# Patient Record
Sex: MALE | Race: WHITE | HISPANIC OR LATINO | Employment: FULL TIME | URBAN - METROPOLITAN AREA
[De-identification: names, ages, dates, MRNs, and addresses within clinical notes are randomized per-mention and may not be internally consistent; named-entity substitution may affect disease eponyms.]

---

## 2017-01-30 ENCOUNTER — APPOINTMENT (OUTPATIENT)
Dept: RADIOLOGY | Facility: MEDICAL CENTER | Age: 52
End: 2017-01-30
Attending: EMERGENCY MEDICINE
Payer: COMMERCIAL

## 2017-01-30 ENCOUNTER — HOSPITAL ENCOUNTER (EMERGENCY)
Facility: MEDICAL CENTER | Age: 52
End: 2017-01-30
Attending: EMERGENCY MEDICINE
Payer: COMMERCIAL

## 2017-01-30 VITALS
SYSTOLIC BLOOD PRESSURE: 117 MMHG | TEMPERATURE: 99.6 F | RESPIRATION RATE: 18 BRPM | DIASTOLIC BLOOD PRESSURE: 79 MMHG | OXYGEN SATURATION: 96 % | HEIGHT: 64 IN | HEART RATE: 71 BPM

## 2017-01-30 DIAGNOSIS — R51.9 NONINTRACTABLE EPISODIC HEADACHE, UNSPECIFIED HEADACHE TYPE: ICD-10-CM

## 2017-01-30 DIAGNOSIS — J98.4 LUNG CYST: ICD-10-CM

## 2017-01-30 DIAGNOSIS — R42 DIZZINESS: ICD-10-CM

## 2017-01-30 LAB
ALBUMIN SERPL BCP-MCNC: 4.7 G/DL (ref 3.2–4.9)
ALBUMIN/GLOB SERPL: 1.4 G/DL
ALP SERPL-CCNC: 79 U/L (ref 30–99)
ALT SERPL-CCNC: 24 U/L (ref 2–50)
ANION GAP SERPL CALC-SCNC: 11 MMOL/L (ref 0–11.9)
APTT PPP: 28.7 SEC (ref 24.7–36)
AST SERPL-CCNC: 21 U/L (ref 12–45)
BASOPHILS # BLD AUTO: 0.9 % (ref 0–1.8)
BASOPHILS # BLD: 0.06 K/UL (ref 0–0.12)
BILIRUB SERPL-MCNC: 0.9 MG/DL (ref 0.1–1.5)
BUN SERPL-MCNC: 15 MG/DL (ref 8–22)
CALCIUM SERPL-MCNC: 10 MG/DL (ref 8.5–10.5)
CHLORIDE SERPL-SCNC: 105 MMOL/L (ref 96–112)
CO2 SERPL-SCNC: 25 MMOL/L (ref 20–33)
CREAT SERPL-MCNC: 0.7 MG/DL (ref 0.5–1.4)
EOSINOPHIL # BLD AUTO: 0.01 K/UL (ref 0–0.51)
EOSINOPHIL NFR BLD: 0.1 % (ref 0–6.9)
ERYTHROCYTE [DISTWIDTH] IN BLOOD BY AUTOMATED COUNT: 40.9 FL (ref 35.9–50)
GFR SERPL CREATININE-BSD FRML MDRD: >60 ML/MIN/1.73 M 2
GLOBULIN SER CALC-MCNC: 3.4 G/DL (ref 1.9–3.5)
GLUCOSE SERPL-MCNC: 95 MG/DL (ref 65–99)
HCT VFR BLD AUTO: 45.7 % (ref 42–52)
HGB BLD-MCNC: 16.1 G/DL (ref 14–18)
IMM GRANULOCYTES # BLD AUTO: 0.01 K/UL (ref 0–0.11)
IMM GRANULOCYTES NFR BLD AUTO: 0.1 % (ref 0–0.9)
INR PPP: 1.06 (ref 0.87–1.13)
LYMPHOCYTES # BLD AUTO: 2.83 K/UL (ref 1–4.8)
LYMPHOCYTES NFR BLD: 41.8 % (ref 22–41)
MCH RBC QN AUTO: 32.2 PG (ref 27–33)
MCHC RBC AUTO-ENTMCNC: 35.2 G/DL (ref 33.7–35.3)
MCV RBC AUTO: 91.4 FL (ref 81.4–97.8)
MONOCYTES # BLD AUTO: 0.57 K/UL (ref 0–0.85)
MONOCYTES NFR BLD AUTO: 8.4 % (ref 0–13.4)
NEUTROPHILS # BLD AUTO: 3.29 K/UL (ref 1.82–7.42)
NEUTROPHILS NFR BLD: 48.7 % (ref 44–72)
NRBC # BLD AUTO: 0 K/UL
NRBC BLD AUTO-RTO: 0 /100 WBC
PLATELET # BLD AUTO: 291 K/UL (ref 164–446)
PMV BLD AUTO: 9.4 FL (ref 9–12.9)
POTASSIUM SERPL-SCNC: 3.8 MMOL/L (ref 3.6–5.5)
PROT SERPL-MCNC: 8.1 G/DL (ref 6–8.2)
PROTHROMBIN TIME: 14.1 SEC (ref 12–14.6)
RBC # BLD AUTO: 5 M/UL (ref 4.7–6.1)
SODIUM SERPL-SCNC: 141 MMOL/L (ref 135–145)
TROPONIN I SERPL-MCNC: <0.01 NG/ML (ref 0–0.04)
WBC # BLD AUTO: 6.8 K/UL (ref 4.8–10.8)

## 2017-01-30 PROCEDURE — 70498 CT ANGIOGRAPHY NECK: CPT

## 2017-01-30 PROCEDURE — 80053 COMPREHEN METABOLIC PANEL: CPT

## 2017-01-30 PROCEDURE — 70496 CT ANGIOGRAPHY HEAD: CPT

## 2017-01-30 PROCEDURE — 84484 ASSAY OF TROPONIN QUANT: CPT

## 2017-01-30 PROCEDURE — 99284 EMERGENCY DEPT VISIT MOD MDM: CPT

## 2017-01-30 PROCEDURE — 85610 PROTHROMBIN TIME: CPT

## 2017-01-30 PROCEDURE — 96360 HYDRATION IV INFUSION INIT: CPT

## 2017-01-30 PROCEDURE — 700117 HCHG RX CONTRAST REV CODE 255: Performed by: EMERGENCY MEDICINE

## 2017-01-30 PROCEDURE — 700105 HCHG RX REV CODE 258: Performed by: EMERGENCY MEDICINE

## 2017-01-30 PROCEDURE — 94760 N-INVAS EAR/PLS OXIMETRY 1: CPT

## 2017-01-30 PROCEDURE — 85730 THROMBOPLASTIN TIME PARTIAL: CPT

## 2017-01-30 PROCEDURE — 85025 COMPLETE CBC W/AUTO DIFF WBC: CPT

## 2017-01-30 PROCEDURE — 93005 ELECTROCARDIOGRAM TRACING: CPT | Performed by: EMERGENCY MEDICINE

## 2017-01-30 RX ORDER — SODIUM CHLORIDE 9 MG/ML
1000 INJECTION, SOLUTION INTRAVENOUS ONCE
Status: COMPLETED | OUTPATIENT
Start: 2017-01-30 | End: 2017-01-30

## 2017-01-30 RX ORDER — MECLIZINE HYDROCHLORIDE 25 MG/1
25 TABLET ORAL 3 TIMES DAILY PRN
Qty: 30 TAB | Refills: 0 | Status: SHIPPED | OUTPATIENT
Start: 2017-01-30

## 2017-01-30 RX ADMIN — IOHEXOL 100 ML: 350 INJECTION, SOLUTION INTRAVENOUS at 21:59

## 2017-01-30 RX ADMIN — SODIUM CHLORIDE 1000 ML: 9 INJECTION, SOLUTION INTRAVENOUS at 21:06

## 2017-01-30 ASSESSMENT — ENCOUNTER SYMPTOMS
FALLS: 1
SPEECH CHANGE: 0
TINGLING: 0
NAUSEA: 0
MEMORY LOSS: 0
SENSORY CHANGE: 0
DIZZINESS: 1
LOSS OF CONSCIOUSNESS: 0
HEADACHES: 1
VOMITING: 0
NECK PAIN: 0

## 2017-01-30 ASSESSMENT — PAIN SCALES - GENERAL
PAINLEVEL_OUTOF10: 0
PAINLEVEL_OUTOF10: 8

## 2017-01-30 ASSESSMENT — PAIN SCALES - WONG BAKER: WONGBAKER_NUMERICALRESPONSE: HURTS EVEN MORE

## 2017-01-30 NOTE — ED AVS SNAPSHOT
After Visit Summary                                                                                                                Tee Meadows   MRN: [id number]    Department:  [facility], Emergency Dept   Date of Visit:  1/30/2017            [facility], Emergency Dept    [address] [facility] [address]    Phone:  [phone]      You were seen by     Mariusz Finley M.D.      Your Diagnosis Was     Dizziness     R42       These are the medications you received during your hospitalization from 01/30/2017 1824 to 01/30/2017 2251     Date/Time Order Dose Route Action    01/30/2017 2106 NS infusion 1,000 mL 1,000 mL Intravenous New Bag    01/30/2017 2159 iohexol (OMNIPAQUE) 350 mg/mL 100 mL Intravenous Given      Follow-up Information     1. Follow up with [facility]. Schedule an appointment as soon as possible for a visit in 2 days.    Contact information    [address] [facility] [address] [facility] [id number] [phone]          2. Follow up with [facility].    Contact information    [address]  [facility] [address] [phone]          3. Follow up with [facility].    Why:  If you need a doctor    Contact information    [address] [address] [phone]      Medication Information     Review all of your home medications and newly ordered medications with your primary doctor and/or pharmacist as soon as possible. Follow medication instructions as directed by your doctor and/or pharmacist.     Please keep your complete medication list with you and share with your physician. Update the information when medications are discontinued, doses are changed, or new medications (including over-the-counter products) are added; and carry medication information at all times in the event of emergency situations.               Medication List      START taking these medications        Instructions    meclizine 25 MG Tabs   Commonly known as:   ANTIVERT    Take 1 Tab by mouth 3 times a day as needed.   Dose:  25 mg               Procedures and tests performed during your visit     APTT    CBC WITH DIFFERENTIAL    COMP METABOLIC PANEL    CONSENT FOR CONTRAST INJECTION    CT-CTA HEAD WITH & W/O-POST PROCESS    CT-CTA NECK WITH & W/O-POST PROCESSING    Cardiac Monitoring    EKG (NOW)    ESTIMATED GFR    IV Saline Lock    Obtain Old EKG    PROTHROMBIN TIME    Pulse Ox    TROPONIN        Discharge Instructions       Por favor, siga con un médico de cabecera para la gestión de la presión arterial, la detección de diabéticos, y todas las otras preocupaciones de bertram preventiva    Your have a lung cyst seen on the CT scan in your right lung. You need to have this followed up by your doctor. Return if you have chest pain, shortness of breath, severe dizziness or pass out.       Mareos  (Dizziness)  Los mareos son un problema muy frecuente. Se trata de georgi sensación de inestabilidad o de desvanecimiento. Puede sentir que se va a desmayar. Un mareo puede provocarle georgi lesión si se tropieza o se . Las personas de todas las edades pueden sufrir mareos, saul es más frecuente en los adultos mayores. Esta afección puede tener muchas causas, entre las que se pueden mencionar los medicamentos, la deshidratación y las enfermedades.  INSTRUCCIONES PARA EL CUIDADO EN EL HOGAR  Estas indicaciones pueden ayudarlo con el trastorno:  Comida y bebida  · Nimo suficiente líquido para mantener la orina duke o de color amarillo pálido. [place] maricarmen la deshidratación. Trate de beber más líquidos transparentes, juanita agua.  · No nimo alcohol.  · Limite el consumo de cafeína si el médico se lo indica.  · Limite el consumo de sal si el médico se lo indica.  Actividad  · Evite los movimientos rápidos.  · Levántese de las joseph con lentitud y apóyese hasta sentirse micki.  · Por la mañana, siéntese elizabeth a un lado de la cama. Cuando se sienta micki, póngase lentamente de pie mientras se  sostiene de algo, hasta que sepa que ha logrado el equilibrio.  · Mueva las piernas con frecuencia si debe estar de pie en un lugar mathew mucho tiempo. Mientras está de pie, contraiga y relaje los músculos de las piernas.  · No conduzca vehículos ni opere maquinaria pesada si se siente mareado.  · Evite agacharse si se siente mareado. En morley casa, coloque los objetos de modo que le resulte fácil alcanzarlos sin agacharse.  Estilo de keith  · No consuma ningún producto que contenga tabaco, lo que incluye cigarrillos, tabaco de mascar o cigarrillos electrónicos. Si necesita ayuda para dejar de fumar, consulte al médico.  · Trate de reducir el nivel de estrés practicando actividades juanita el yoga o la meditación. Hable con el médico si necesita ayuda.  Instrucciones generales  · Controle indira mareos para rachell si hay cambios.  · Retreat los medicamentos solamente juanita se lo haya indicado el médico. Hable con el médico si gee que los medicamentos que está tomando son la causa de indira mareos.  · Infórmele a un amigo o a un familiar si se siente mareado. Pídale a esta persona que llame al médico si observa cambios en morley comportamiento.  · Concurra a todas las visitas de control juanita se lo haya indicado el médico. [place] es importante.  SOLICITE ATENCIÓN MÉDICA SI:  · Los mareos persisten.  · Los mareos o la sensación de desvanecimiento empeoran.  · Siente náuseas.  · Ha perdido la audición.  · Aparecen nuevos síntomas.  · Cuando está de pie se siente inestable o que la habitación da vueltas.  SOLICITE ATENCIÓN MÉDICA DE INMEDIATO SI:  · Vomita o tiene diarrea y no puede comer ni beber nada.  · Tiene dificultad para hablar, para caminar, para tragar o para usar los brazos, las jamila o las piernas.  · Siente georgi debilidad generalizada.  · No piensa con claridad o tiene dificultades para armar oraciones. Es posible que un amigo o un familiar adviertan que esto ocurre.  · Tiene dolor de pecho, dolor abdominal, sudoración o le falta el  aire.  · Hay cambios en la visión.  · Observa un sangrado.  · Tiene erica de curtis.  · Tiene dolor o rigidez en el mick.  · Tiene fiebre.     Esta información no tiene juanita fin reemplazar el consejo del médico. Asegúrese de hacerle al médico cualquier pregunta que tenga.     Document Released: 12/18/2006 Document Revised: 05/03/2016  [employer] Patient Education ©2016 Elsevier Inc.      Dolor de curtis general sin causa   (General Headache Without Cause)   Un dolor de curtis en general es un dolor o malestar que se siente en la pauline de la curtis o del mick. Se desconocen las causas.   CUIDADOS EN EL HOGAR   · Cumpla con los controles médicos según las indicaciones.  · [place] sólo los medicamentos que le haya indicado el médico.  · Cuando sienta dolor de curtis acuéstese en un cuarto oscuro y tranquilo.  · Lleve un registro diario para averiguar si ciertas cosas provocan erica de curtis. Por ejemplo, escriba:  ¨ Lo que come y johnny.  ¨ Cuánto tiempo duerme.  ¨ Todo cambio en la dieta o medicamentos.  · Relájese recibiendo masajes o lanie otras actividades relajantes.  · Coloque hielo o calor en la curtis y el mick juanita lo indique morley médico.  · DISMINUYA EL NIVEL DE ESTRÉS  · Siéntase con la espalda recta. No apriete los músculos (tensione).  · Si fuma, deje de hacerlo.  · Nimo menos alcohol.  · Consuma menos cafeína o deje de tomarla.  · Coma y duerma en horarios regulares.  · Duerma entre 7 y 9 horas o juanita le indique morley médico.  · Mantenga las luces tenues si le molesta las luces brillantes o rosa erica de curtis empeoran.  SOLICITE AYUDA DE INMEDIATO SI:   · El dolor de curtis empeora.  · Tiene fiebre.  · Presenta rigidez en el mick.  · Tiene dificultad para rachell.  · Rosa músculos están débiles o pierde el control muscular.  · Pierde equilibrio o tiene problemas para caminar.  · Siente que se desvanece (debilidad) o se desmaya.  · Tiene síntomas intensos que son diferentes a los primeros  síntomas.  · Tiene problemas con los medicamentos que le recetó morley médico.  · El medicamento no le hace efecto.  · Siente que el dolor de curtis es diferente a otros erica de curtis.  · Tiene malestar estomacal (náuseas) o (vómitos).     Esta información no tiene juanita fin reemplazar el consejo del médico. Asegúrese de hacerle al médico cualquier pregunta que tenga.     Document Released: 03/11/2013 Document Revised: 05/03/2016  [employer] Interactive Patient Education ©2016 [employer] Inc.                Patient Information     Patient Information    Following emergency treatment: all patient requiring follow-up care must return either to a private physician or a clinic if your condition worsens before you are able to obtain further medical attention, please return to the emergency room.     Billing Information    At [facility], we work to make the billing process streamlined for our patients.  Our Representatives are here to answer any questions you may have regarding your hospital bill.  If you have insurance coverage and have supplied your insurance information to us, we will submit a claim to your insurer on your behalf.  Should you have any questions regarding your bill, we can be reached online or by phone as follows:  Online: You are able pay your bills online or live chat with our representatives about any billing questions you may have. We are here to help Monday - Friday from 8:00am to 7:30pm and 9:00am - 12:00pm on Saturdays.  Please visit [email or web address]  for more information.   Phone:  [phone] or [phone]    Please note that your emergency physician, surgeon, pathologist, radiologist, anesthesiologist, and other specialists are not employed by [facility] and will therefore bill separately for their services.  Please contact them directly for any questions concerning their bills at the numbers below:     Emergency Physician Services:  [phone]  [facility]:  [phone]  [facility]:  [phone]  [facility]:  [phone]    1. Your final bill may vary from the amount quoted upon discharge if all procedures are not complete at that time, or if your doctor has additional procedures of which we are not aware. You will receive an additional bill if you return to the Emergency Department at [facility] for suture removal regardless of the facility of which the sutures were placed.     2. Please arrange for settlement of this account at the emergency registration.    3. All self-pay accounts are due in full at the time of treatment.  If you are unable to meet this obligation then payment is expected within 4-5 days.     4. If you have had radiology studies (CT, X-ray, Ultrasound, MRI), you have received a preliminary result during your emergency department visit. Please contact the radiology department [phone] to receive a copy of your final result. Please discuss the Final result with your primary physician or with the follow up physician provided.     Crisis Hotline:  [place] Crisis Hotline:  4-203-OZUCASW or 1-836.908.9117  [place] Crisis Hotline:    1-802.826.4318 or 305-358-9275         ED Discharge Follow Up Questions    1. In order to provide you with very good care, we would like to follow up with a phone call in the next few days.  May we have your permission to contact you?     YES /  NO    2. What is the best phone number to call you? (       )_____-__________    3. What is the best time to call you?      Morning  /  Afternoon  /  Evening                   Patient Signature:  ____________________________________________________________    Date:  ____________________________________________________________

## 2017-01-30 NOTE — ED AVS SNAPSHOT
1/30/2017          Teegerardo Meadows  [address] [address]    Dear Tee:    [facility] wants to ensure your discharge home is safe and you or your loved ones have had all your questions answered regarding your care after you leave the hospital.    You may receive a telephone call within two days of your discharge.  This call is to make certain you understand your discharge instructions as well as ensure we provided you with the best care possible during your stay with us.     The call will only last approximately 3-5 minutes and will be done by a nurse.    Once again, we want to ensure your discharge home is safe and that you have a clear understanding of any next steps in your care.  If you have any questions or concerns, please do not hesitate to contact us, we are here for you.  Thank you for choosing [facility] for your healthcare needs.    Sincerely,    Jayjay Hernandez    [facility]

## 2017-01-30 NOTE — LETTER
"  FORM C-4:  EMPLOYEE’S CLAIM FOR COMPENSATION/ REPORT OF INITIAL TREATMENT  EMPLOYEE’S CLAIM - PROVIDE ALL INFORMATION REQUESTED   First Name  Tee Last Name  Enrique Meadows Birthdate             Age  1965 51 y.o. Sex  male Claim Number   Home Employee Address  [address]                                     Zip  [address] Height  1.626 m (5' 4.02\") Weight    SSN  [id number]   Mailing Employee Address                           [address]               Zip  [address] Telephone  [phone] (home)  Primary Language Spoken  ENGLISH   Insurer  [employer] Third Party   [employer] [facility] Employee's Occupation (Job Title) When Injury or Occupational Disease Occurred     Employer's Name  [employer] Telephone  [phone]    Employer Address  [address] Zip  [address]   Date of Injury  12/7/2016       Hour of Injury  2:30 PM Date Employer Notified  12/7/2016 Last Day of Work after Injury or Occupational Disease  1/30/2017 Supervisor to Whom Injury Reported  Liu   Address or Location of Accident (if applicable)  [[address] [place] [address]]   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 Be specific and answer in detail. Use additional sheet if necessary)  Unloading trailer I fell back and one of the bags held my foot down, when I fell back I  hit my head on the trailer   If you believe that you have an occupational disease, when did you first have knowledge of the disability and it relationship to your employment?  No Witnesses to the Accident  Adithya Hatfield [place]     Nature of Injury or Occupational Disease  Contusion  Part(s) of Body Injured or Affected  Skull, Defer, Defer    I certify that the above is true and correct to the best of my knowledge and " that I have provided this information in order to obtain the benefits of [place] or [place] of [place]).  I hereby authorize any physician, chiropractor, surgeon, practitioner, or other person, any hospital, including [facility] or [facility] hospital, any medical service organization, any insurance company, or other institution or organization to release to each other, any medical or other information, including benefits paid or payable, pertinent to this injury or disease, except information relative to diagnosis, treatment and/or counseling for AIDS, psychological conditions, alcohol or controlled substances, for which I must give specific authorization.  A Photostat of this authorization shall be as valid as the original.   Date  01/30/2017 [facility] Employee’s Signature   THIS REPORT MUST BE COMPLETED AND MAILED WITHIN 3 WORKING DAYS OF TREATMENT   Place  [facility], EMERGENCY DEPT  Name of Facility   [facility]   Date  1/30/2017 Diagnosis  (R42) Dizziness  (R51) Nonintractable episodic headache, unspecified headache type  (J98.4) Lung cyst Is there evidence the injured employee was under the influence of alcohol and/or another controlled substance at the time of accident?   Hour  10:47 PM Description of Injury or Disease  Dizziness  Nonintractable episodic headache, unspecified headache type  Lung cyst     Treatment  IV fluids, antivert  Have you advised the patient to remain off work five days or more?         No   X-Ray Findings  Negative  Comments:CT head, CTA neck and head   If Yes   From Date    To Date      From information given by the employee, together with medical evidence, can you directly connect this injury or occupational disease as job incurred?  No If No, is the employee capable of: Full Duty  No Modified Duty  Yes   Is additional medical care by  "a physician indicated?  Yes If Modified Duty, Specify any Limitations / Restrictions  No climbing ladders or working at heights.      Do you know of any previous injury or disease contributing to this condition or occupational disease?  No   Date  1/30/2017 Print Doctor’s Name  AbdirizakemeraldMariusz certify the employer’s copy of this form was mailed on:   Address  [address] [place] [id number] [phone] Insurer’s Use Only   [facility]  [address]    Provider’s Tax ID Number  [id number] Telephone  Dept: [phone]    Doctor’s Signature  e-MARIUSZ [place] M.D. Degree   M.D.    Original - TREATING PHYSICIAN OR CHIROPRACTOR   Pg 2-Insurer/TPA   Pg 3-Employer   Pg 4-Employee                                                                                                  Form C-4 (rev01/03)     BRIEF DESCRIPTION OF RIGHTS AND BENEFITS  (Pursuant to [place]RS 616C.050)    Notice of Injury or Occupational Disease (Incident Report Form C-1): If an injury or occupational disease (OD) arises out of and in the course of employment, you must provide written notice to your employer as soon as practicable, but no later than 7 days after the accident or OD. Your employer shall maintain a sufficient supply of the required forms.    Claim for Compensation (Form C-4): If medical treatment is sought, the form C-4 is available at the place of initial treatment. A completed \"Claim for Compensation\" (Form C-4) must be filed within 90 days after an accident or OD. The treating physician or chiropractor must, within 3 working days after treatment, complete and mail to the employer, the employer's insurer and third-party , the Claim for Compensation.    Medical Treatment: If you require medical treatment for your on-the-job injury or OD, you may be required to select a physician or chiropractor from a list provided by your workers’ compensation insurer, if it has contracted with an " Organization for Managed Care (MCO) or Preferred Provider Organization (PPO) or providers of health care. If your employer has not entered into a contract with an MCO or PPO, you may select a physician or chiropractor from the Panel of Physicians and Chiropractors. Any medical costs related to your industrial injury or OD will be paid by your insurer.    Temporary Total Disability (TTD): If your doctor has certified that you are unable to work for a period of at least 5 consecutive days, or 5 cumulative days in a 20-day period, or places restrictions on you that your employer does not accommodate, you may be entitled to TTD compensation.    Temporary Partial Disability (TPD): If the wage you receive upon reemployment is less than the compensation for TTD to which you are entitled, the insurer may be required to pay you TPD compensation to make up the difference. TPD can only be paid for a maximum of 24 months.    Permanent Partial Disability (PPD): When your medical condition is stable and there is an indication of a PPD as a result of your injury or OD, within 30 days, your insurer must arrange for an evaluation by a rating physician or chiropractor to determine the degree of your PPD. The amount of your PPD award depends on the date of injury, the results of the PPD evaluation and your age and wage.    Permanent Total Disability (PTD): If you are medically certified by a treating physician or chiropractor as permanently and totally disabled and have been granted a PTD status by your insurer, you are entitled to receive monthly benefits not to exceed 66 2/3% of your average monthly wage. The amount of your PTD payments is subject to reduction if you previously received a PPD award.    Vocational Rehabilitation Services: You may be eligible for vocational rehabilitation services if you are unable to return to the job due to a permanent physical impairment or permanent restrictions as a result of your injury or  occupational disease.    Transportation and Per Viky Reimbursement: You may be eligible for travel expenses and per viky associated with medical treatment.  Reopening: You may be able to reopen your claim if your condition worsens after claim closure.    Appeal Process: If you disagree with a written determination issued by the insurer or the insurer does not respond to your request, you may appeal to the Department of Administration, , by following the instructions contained in your determination letter. You must appeal the determination within 70 days from the date of the determination letter at [address], [address], [place], [place] [address], or [address], [address], [place], [place] [address]. If you disagree with the  decision, you may appeal to the Department of Administration, . You must file your appeal within 30 days from the date of the  decision letter at [address], [address], [place], [place] [address], or [address] [facility] [address], [place], [place] [address]. If you disagree with a decision of an , you may file a petition for judicial review with the District Court. You must do so within 30 days of the Appeal Officer’s decision. You may be represented by an  at your own expense or you may contact the [facility] for possible representation.    [place]  for Injured Workers (NAIW): If you disagree with a  decision, you may request that NAIW represent you without charge at an  Hearing. For information regarding denial of benefits, you may contact the [facility] at: [address], [address], [place] [address], [phone], or [address] [facility] [address] [address], [phone]    To File a Complaint with the Division: If you wish to file a complaint with the  of the Division of Industrial Relations (DIR), please contact  the Workers’ Compensation Section, [address], [address], [place], [place] [address], telephone [phone], or [address], [address], [place], [place] [address], telephone [phone].    For assistance with Workers’ Compensation Issues: you may contact the Office of the [facility] Consumer Health Assistance, [address] [place] [address], [address], [place], [place] [address], Toll Free 1-815.333.7250, Web site: [email or web address]., E-mail [email or web address].                                                                                                                                                                               __________________________________________________________________                                    ___01/30/2016_______            Employee Name / Signature                                                                                                                            Date                                       D-2 (rev. 10/07)

## 2017-01-30 NOTE — LETTER
"  FORM C-4:  EMPLOYEE’S CLAIM FOR COMPENSATION/ REPORT OF INITIAL TREATMENT  EMPLOYEE’S CLAIM - PROVIDE ALL INFORMATION REQUESTED   First Name  Tee Last Name  Enrique Meadows Birthdate             Age  1965 51 y.o. Sex  male Claim Number   Home Employee Address  [address]                                     Zip  [address] Height  1.626 m (5' 4.02\") Weight    SSN  [id number]   Mailing Employee Address                           [address]               Zip  [address] Telephone  [phone] (home)  Primary Language Spoken  ENGLISH   Insurer  *** Third Party   [employer] Employee's Occupation (Job Title) When Injury or Occupational Disease Occurred     Employer's Name   Telephone      Employer Address   City   State   Zip     Date of Injury  12/7/2016       Hour of Injury  2:30 PM Date Employer Notified  12/7/2016 Last Day of Work after Injury or Occupational Disease  1/30/2017 Supervisor to Whom Injury Reported  [facility]   Address or Location of Accident (if applicable)  [[address] [place] [address]]   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 Be specific and answer in detail. Use additional sheet if necessary)  Unloading trailer I fell back and one of the bags held my foot down, when I fell back I  hit my head on the trailer   If you believe that you have an occupational disease, when did you first have knowledge of the disability and it relationship to your employment?  No Witnesses to the Accident  Adithya Hatfield Estevan     Nature of Injury or Occupational Disease  Contusion  Part(s) of Body Injured or Affected  Skull, Defer, Defer    I certify that the above is true and correct to the best of my knowledge and that I have provided this information in order to obtain the benefits of [place]’s " [place] inclusive or Chapter 617 of [place]).  I hereby authorize any physician, chiropractor, surgeon, practitioner, or other person, any hospital, including [facility] or [facility], any medical service organization, any insurance company, or other institution or organization to release to each other, any medical or other information, including benefits paid or payable, pertinent to this injury or disease, except information relative to diagnosis, treatment and/or counseling for AIDS, psychological conditions, alcohol or controlled substances, for which I must give specific authorization.  A Photostat of this authorization shall be as valid as the original.   Date Place   Employee’s Signature   THIS REPORT MUST BE COMPLETED AND MAILED WITHIN 3 WORKING DAYS OF TREATMENT   Place  [facility], EMERGENCY DEPT  Name of Facility   [facility]   Date  1/30/2017 Diagnosis  (R42) Dizziness  (R51) Nonintractable episodic headache, unspecified headache type Is there evidence the injured employee was under the influence of alcohol and/or another controlled substance at the time of accident?   Hour  10:38 PM Description of Injury or Disease  Dizziness  Nonintractable episodic headache, unspecified headache type     Treatment     Have you advised the patient to remain off work five days or more?             X-Ray Findings      If Yes   From Date    To Date      From information given by the employee, together with medical evidence, can you directly connect this injury or occupational disease as job incurred?    If No, is the employee capable of: Full Duty    Modified Duty      Is additional medical care by a physician indicated?    If Modified Duty, Specify any Limitations / Restrictions        Do you know of any previous injury or disease contributing to this condition or occupational disease?     "  Date  1/30/2017 Print Doctor’s Name  Mariusz Finley JENY certify the employer’s copy of this form was mailed on:   Address  [address] [address] [phone] Insurer’s Use Only   [facility]  [address]    Provider’s Tax ID Number  [id number] Telephone  Dept: [phone]    Doctor’s Signature    Degree       Original - TREATING PHYSICIAN OR CHIROPRACTOR   Pg 2-Insurer/TPA   Pg 3-Employer   Pg 4-Employee                                                                                                  Form C-4 (rev01/03)     BRIEF DESCRIPTION OF RIGHTS AND BENEFITS  (Pursuant to [place]RS 616C.050)    Notice of Injury or Occupational Disease (Incident Report Form C-1): If an injury or occupational disease (OD) arises out of and in the course of employment, you must provide written notice to your employer as soon as practicable, but no later than 7 days after the accident or OD. Your employer shall maintain a sufficient supply of the required forms.    Claim for Compensation (Form C-4): If medical treatment is sought, the form C-4 is available at the place of initial treatment. A completed \"Claim for Compensation\" (Form C-4) must be filed within 90 days after an accident or OD. The treating physician or chiropractor must, within 3 working days after treatment, complete and mail to the employer, the employer's insurer and third-party , the Claim for Compensation.    Medical Treatment: If you require medical treatment for your on-the-job injury or OD, you may be required to select a physician or chiropractor from a list provided by your workers’ compensation insurer, if it has contracted with an Organization for Managed Care (MCO) or Preferred Provider Organization (PPO) or providers of health care. If your employer has not entered into a contract with an MCO or PPO, you may select a physician or chiropractor from the Panel of Physicians and Chiropractors. Any medical costs " related to your industrial injury or OD will be paid by your insurer.    Temporary Total Disability (TTD): If your doctor has certified that you are unable to work for a period of at least 5 consecutive days, or 5 cumulative days in a 20-day period, or places restrictions on you that your employer does not accommodate, you may be entitled to TTD compensation.    Temporary Partial Disability (TPD): If the wage you receive upon reemployment is less than the compensation for TTD to which you are entitled, the insurer may be required to pay you TPD compensation to make up the difference. TPD can only be paid for a maximum of 24 months.    Permanent Partial Disability (PPD): When your medical condition is stable and there is an indication of a PPD as a result of your injury or OD, within 30 days, your insurer must arrange for an evaluation by a rating physician or chiropractor to determine the degree of your PPD. The amount of your PPD award depends on the date of injury, the results of the PPD evaluation and your age and wage.    Permanent Total Disability (PTD): If you are medically certified by a treating physician or chiropractor as permanently and totally disabled and have been granted a PTD status by your insurer, you are entitled to receive monthly benefits not to exceed 66 2/3% of your average monthly wage. The amount of your PTD payments is subject to reduction if you previously received a PPD award.    Vocational Rehabilitation Services: You may be eligible for vocational rehabilitation services if you are unable to return to the job due to a permanent physical impairment or permanent restrictions as a result of your injury or occupational disease.    Transportation and Per Viky Reimbursement: You may be eligible for travel expenses and per viky associated with medical treatment.  Reopening: You may be able to reopen your claim if your condition worsens after claim closure.    Appeal Process: If you disagree  with a written determination issued by the insurer or the insurer does not respond to your request, you may appeal to the Department of Administration, , by following the instructions contained in your determination letter. You must appeal the determination within 70 days from the date of the determination letter at [address], [address], [place], [place] [address], or [address], [address], [place], [place] [address]. If you disagree with the  decision, you may appeal to the Department of Administration, . You must file your appeal within 30 days from the date of the  decision letter at [address], [address], [place], [place] [address], or [address], [facility] [address] [address] [facility] [address], [place], [place] [address]. If you disagree with a decision of an , you may file a petition for judicial review with the District Court. You must do so within 30 days of the Appeal Officer’s decision. You may be represented by an  at your own expense or you may contact the [facility] for possible representation.    [place]  for Injured Workers (NAIW): If you disagree with a  decision, you may request that NAIW represent you without charge at an  Hearing. For information regarding denial of benefits, you may contact the [facility] at: [address], [address], [place] [address], [phone], or [address], [address], [place] [address], [phone]    To File a Complaint with the Division: If you wish to file a complaint with the  of the Division of Industrial Relations (DIR), please contact the Workers’ Compensation Section, [address], [address], [place], [place] [address], telephone [phone]) [phone], or [address], [facility] [address] [facility] [address], [place], [place] [address], telephone [phone].    For assistance with Workers’ Compensation Issues: you may contact  the Office of the Health systemor Consumer Health Assistance, [address] [place] [address], [address], [place], [place] [address], Toll Free 1-243.392.5654, Web site: http://govcha.[facility].nv.us, E-mail [email or web address].                                                                                                                                                                               __________________________________________________________________                                    _________________            Employee Name / Signature                                                                                                                            Date                                       D-2 (rev. 10/07)

## 2017-01-30 NOTE — ED AVS SNAPSHOT
Bookigee Access Code: [email or web address]  Expires: 3/1/2017 10:51 PM    Bookigee  A secure, online tool to manage your health information     "[employer]’s Bookigee® is a secure, online tool that connects you to your personalized health information from the privacy of your home -- day or night - making it very easy for you to manage your healthcare. Once the activation process is completed, you can even access your medical information using the Bookigee saba, which is available for free in the Apple Saba store or Google Play store.     Bookigee provides the following levels of access (as shown below):   My Chart Features   [facility] Primary Care Doctor [facility]  Specialists [facility]  Urgent  Care Non-[facility]  Primary Care  Doctor   Email your healthcare team securely and privately 24/7 X X X X   Manage appointments: schedule your next appointment; view details of past/upcoming appointments X      Request prescription refills. X      View recent personal medical records, including lab and immunizations X X X X   View health record, including health history, allergies, medications X X X X   Read reports about your outpatient visits, procedures, consult and ER notes X X X X   See your discharge summary, which is a recap of your hospital and/or ER visit that includes your diagnosis, lab results, and care plan. X X       How to register for Bookigee:  1. Go to  [email or web address].  2. Click on the Sign Up Now box, which takes you to the New Member Sign Up page. You will need to provide the following information:  a. Enter your Bookigee Access Code exactly as it appears at the top of this page. (You will not need to use this code after you’ve completed the sign-up process. If you do not sign up before the expiration date, you must request a new code.)   b. Enter your date of birth.   c. Enter your home email address.   d. Click Submit, and follow the next screen’s instructions.  3. Create a Bookigee ID. This will be your Bookigee  login ID and cannot be changed, so think of one that is secure and easy to remember.  4. Create a [employer] password. You can change your password at any time.  5. Enter your Password Reset Question and Answer. This can be used at a later time if you forget your password.   6. Enter your e-mail address. This allows you to receive e-mail notifications when new information is available in [employer].  7. Click Sign Up. You can now view your health information.    For assistance activating your [employer] account, call [phone]

## 2017-01-30 NOTE — LETTER
"  FORM C-4:  EMPLOYEE’S CLAIM FOR COMPENSATION/ REPORT OF INITIAL TREATMENT  EMPLOYEE’S CLAIM - PROVIDE ALL INFORMATION REQUESTED   First Name  Tee Last Name  Enrique Meadows Birthdate             Age  1965 51 y.o. Sex  male Claim Number   Home Employee Address  [address]                                     Zip  [address] Height  1.626 m (5' 4.02\") Weight    SSN  [id number]   Mailing Employee Address                           [address]               Zip  [address] Telephone  [phone] (home)  Primary Language Spoken  ENGLISH   Insurer  *** Third Party   [employer] 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 Be specific and answer in detail. Use additional sheet if necessary)  Unloading trailer I fell back and one of the bags held my foot down, when I fell back I  hit my head on the trailer   If you believe that you have an occupational disease, when did you first have knowledge of the disability and it relationship to your employment?  No Witnesses to the Accident  Adithya Hatfield [place]     Nature of Injury or Occupational Disease  Contusion  Part(s) of Body Injured or Affected  Skull, Defer, Defer    I certify that the above is true and correct to the best of my knowledge and that I have provided this " information in order to obtain the benefits of [place]’s [place] and [place] Acts ([place]A [place], inclusive or Chapter 617 of [place]).  I hereby authorize any physician, chiropractor, surgeon, practitioner, or other person, any hospital, including [facility], any medical service organization, any insurance company, or other institution or organization to release to each other, any medical or other information, including benefits paid or payable, pertinent to this injury or disease, except information relative to diagnosis, treatment and/or counseling for AIDS, psychological conditions, alcohol or controlled substances, for which I must give specific authorization.  A Photostat of this authorization shall be as valid as the original.   Date Place   Employee’s Signature   THIS REPORT MUST BE COMPLETED AND MAILED WITHIN 3 WORKING DAYS OF TREATMENT   Place  [facility], EMERGENCY DEPT  Name of Facility   [facility]   Date  1/30/2017 Diagnosis  No diagnosis found. Is there evidence the injured employee was under the influence of alcohol and/or another controlled substance at the time of accident?   Hour  9:39 PM Description of Injury or Disease       Treatment     Have you advised the patient to remain off work five days or more?             X-Ray Findings      If Yes   From Date    To Date      From information given by the employee, together with medical evidence, can you directly connect this injury or occupational disease as job incurred?    If No, is the employee capable of: Full Duty    Modified Duty      Is additional medical care by a physician indicated?    If Modified Duty, Specify any Limitations / Restrictions        Do you know of any previous injury or disease contributing to this condition or occupational disease?      Date  1/30/2017 Print Doctor’s Name  Mariusz Finley I certify the employer’s copy  "of this form was mailed on:   Address  [address]  [place] [id number] [phone] Insurer’s Use Only   [facility]  [address]    Provider’s Tax ID Number  [id number] Telephone  Dept: [phone]    Doctor’s Signature    Degree       Original - TREATING PHYSICIAN OR CHIROPRACTOR   Pg 2-Insurer/TPA   Pg 3-Employer   Pg 4-Employee                                                                                                  Form C-4 (rev01/03)     BRIEF DESCRIPTION OF RIGHTS AND BENEFITS  ([place]ursuant to [place]RS 616C.050)    Notice of Injury or Occupational Disease (Incident Report Form C-1): If an injury or occupational disease (OD) arises out of and in the course of employment, you must provide written notice to your employer as soon as practicable, but no later than 7 days after the accident or OD. Your employer shall maintain a sufficient supply of the required forms.    Claim for Compensation (Form C-4): If medical treatment is sought, the form C-4 is available at the place of initial treatment. A completed \"Claim for Compensation\" (Form C-4) must be filed within 90 days after an accident or OD. The treating physician or chiropractor must, within 3 working days after treatment, complete and mail to the employer, the employer's insurer and third-party , the Claim for Compensation.    Medical Treatment: If you require medical treatment for your on-the-job injury or OD, you may be required to select a physician or chiropractor from a list provided by your workers’ compensation insurer, if it has contracted with an Organization for Managed Care (MCO) or Preferred Provider Organization (PPO) or providers of health care. If your employer has not entered into a contract with an MCO or PPO, you may select a physician or chiropractor from the Panel of Physicians and Chiropractors. Any medical costs related to your industrial injury or OD will be paid by your insurer.    Temporary Total " Disability (TTD): If your doctor has certified that you are unable to work for a period of at least 5 consecutive days, or 5 cumulative days in a 20-day period, or places restrictions on you that your employer does not accommodate, you may be entitled to TTD compensation.    Temporary Partial Disability (TPD): If the wage you receive upon reemployment is less than the compensation for TTD to which you are entitled, the insurer may be required to pay you TPD compensation to make up the difference. TPD can only be paid for a maximum of 24 months.    Permanent Partial Disability (PPD): When your medical condition is stable and there is an indication of a PPD as a result of your injury or OD, within 30 days, your insurer must arrange for an evaluation by a rating physician or chiropractor to determine the degree of your PPD. The amount of your PPD award depends on the date of injury, the results of the PPD evaluation and your age and wage.    Permanent Total Disability (PTD): If you are medically certified by a treating physician or chiropractor as permanently and totally disabled and have been granted a PTD status by your insurer, you are entitled to receive monthly benefits not to exceed 66 2/3% of your average monthly wage. The amount of your PTD payments is subject to reduction if you previously received a PPD award.    Vocational Rehabilitation Services: You may be eligible for vocational rehabilitation services if you are unable to return to the job due to a permanent physical impairment or permanent restrictions as a result of your injury or occupational disease.    Transportation and Per Viky Reimbursement: You may be eligible for travel expenses and per viky associated with medical treatment.  Reopening: You may be able to reopen your claim if your condition worsens after claim closure.    Appeal Process: If you disagree with a written determination issued by the insurer or the insurer does not respond to your  request, you may appeal to the Department of Administration, , by following the instructions contained in your determination letter. You must appeal the determination within 70 days from the date of the determination letter at [address], [address], [place], [place] [address], or [address], [address], [place], [place] [address]. If you disagree with the  decision, you may appeal to the Department of Administration, . You must file your appeal within 30 days from the date of the  decision letter at [address], [address], [place], [place] [address], or [address], [address], [place], [place] [address]. If you disagree with a decision of an , you may file a petition for judicial review with the District Court. You must do so within 30 days of the Appeal Officer’s decision. You may be represented by an  at your own expense or you may contact the [facility] for possible representation.    [place]  for Injured Workers (NAIW): If you disagree with a  decision, you may request that NAIW represent you without charge at an  Hearing. For information regarding denial of benefits, you may contact the [facility] at: [address], [address], [place] [address], [phone], or [address], [address], [place] [address], [phone]    To File a Complaint with the Division: If you wish to file a complaint with the  of the Division of Industrial Relations (DIR), please contact the Workers’ Compensation Section, [address], [address], [place], [place] [address], telephone [phone]) [phone], or [address], [place] [address], telephone [phone].    For assistance with Workers’ Compensation Issues: you may contact the Office of the Governor Consumer Health Assistance, [address], [address], [place], [place] [address], Toll Free 1-366.929.1526, Web site: http://govcha.[facility].nv., E-mail [email or web address].                                                                                                                                                                               __________________________________________________________________                                    _________________            Employee Name / Signature                                                                                                                            Date                                       D-2 (rev. 10/07)

## 2017-01-31 LAB — EKG IMPRESSION: NORMAL

## 2017-01-31 NOTE — ED NOTES
Pt to triage stating he fell backwards while at work one month ago and was hanging upside down for 15-30 seconds off a trailer . Pt felt normal for 2 weeks. Pt now with headache, dizziness and hears a buzzing sound. AAO x 4. Malay speaking. Pt advised to return to triage nurse for any changes or concerns.

## 2017-01-31 NOTE — DISCHARGE INSTRUCTIONS
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 You need to have this followed up by your doctor. Return if you have chest pain, shortness of breath, severe dizziness or pass out.       Mareos  (Dizziness)  Los mareos son un problema muy frecuente. Se trata de georgi sensación de inestabilidad o de desvanecimiento. Puede sentir que se va a desmayar. Un mareo puede provocarle georgi lesión si se tropieza o se . Las personas de todas las edades pueden sufrir mareos, saul es más frecuente en los adultos mayores. Esta afección puede tener muchas causas, entre las que se pueden mencionar los medicamentos, la deshidratación y las enfermedades.  INSTRUCCIONES PARA EL CUIDADO EN EL HOGAR  Estas indicaciones pueden ayudarlo con el trastorno:  Comida y bebida  · Nimo suficiente líquido para mantener la orina duke o de color amarillo pálido. [place] maricarmen la deshidratación. Trate de beber más líquidos transparentes, juanita agua.  · No nimo alcohol.  · Limite el consumo de cafeína si el médico se lo indica.  · Limite el consumo de sal si el médico se lo indica.  Actividad  · Evite los movimientos rápidos.  · Levántese de las joseph con lentitud y apóyese hasta sentirse micki.  · Por la mañana, siéntese elizabeth a un lado de la cama. Cuando se sienta micki, póngase lentamente de pie mientras se sostiene de algo, hasta que sepa que ha logrado el equilibrio.  · Mueva las piernas con frecuencia si debe estar de pie en un lugar mathew mucho tiempo. Mientras está de pie, contraiga y relaje los músculos de las piernas.  · No conduzca vehículos ni opere maquinaria pesada si se siente mareado.  · Evite agacharse si se siente mareado. En morley casa, coloque los objetos de modo que le resulte fácil alcanzarlos sin agacharse.  Estilo de keith  · No consuma ningún producto que  contenga tabaco, lo que incluye cigarrillos, tabaco de mascar o cigarrillos electrónicos. Si necesita ayuda para dejar de fumar, consulte al médico.  · Trate de reducir el nivel de estrés practicando actividades juanita el yoga o la meditación. Hable con el médico si necesita ayuda.  Instrucciones generales  · Controle indira mareos para rachell si hay cambios.  · [place] los medicamentos solamente juanita se lo haya indicado el médico. Hable con el médico si gee que los medicamentos que está tomando son la causa de indira mareos.  · Infórmele a un amigo o a un familiar si se siente mareado. [place]ídale a esta persona que llame al médico si observa cambios en morley comportamiento.  · Concurra a todas las visitas de control juanita se lo haya indicado el médico. Honalo es importante.  SOLICITE ATENCIÓN MÉDICA SI:  · Los mareos persisten.  · Los mareos o la sensación de desvanecimiento empeoran.  · Siente náuseas.  · Ha perdido la audición.  · Aparecen nuevos síntomas.  · Cuando está de pie se siente inestable o que la habitación da vueltas.  SOLICITE ATENCIÓN MÉDICA DE INMEDIATO SI:  · Vomita o tiene diarrea y no puede comer ni beber nada.  · Tiene dificultad para hablar, para caminar, para tragar o para usar los brazos, las jamila o las piernas.  · Siente georgi debilidad generalizada.  · No piensa con claridad o tiene dificultades para armar oraciones. Es posible que un amigo o un familiar adviertan que esto ocurre.  · Tiene dolor de pecho, dolor abdominal, sudoración o le falta el aire.  · Hay cambios en la visión.  · Observa un sangrado.  · Tiene erica de curtis.  · Tiene dolor o rigidez en el mick.  · Tiene fiebre.     Esta información no tiene juanita fin reemplazar el consejo del médico. Asegúrese de hacerle al médico cualquier pregunta que tenga.     Document Released: 12/18/2006 Document Revised: 05/03/2016  [employer] Interactive Patient Education ©2016 Elsevier Inc.      Dolor de curtis general sin causa   (General Headache Without Cause)    Un dolor de curtis en general es un dolor o malestar que se siente en la pauline de la curtis o del mick. Se desconocen las causas.   CUIDADOS EN EL HOGAR   · Cumpla con los controles médicos según las indicaciones.  · [place] sólo los medicamentos que le haya indicado el médico.  · Cuando sienta dolor de curtis acuéstese en un cuarto oscuro y tranquilo.  · Lleve un registro diario para averiguar si ciertas cosas provocan erica de curtis. Por ejemplo, escriba:  ¨ Lo que come y johnny.  ¨ Cuánto tiempo duerme.  ¨ Todo cambio en la dieta o medicamentos.  · Relájese recibiendo masajes o lanie otras actividades relajantes.  · Coloque hielo o calor en la curtis y el mick juanita lo indique morley médico.  · DISMINUYA EL NIVEL DE ESTRÉS  · Siéntase con la espalda recta. No apriete los músculos (tensione).  · Si fuma, deje de hacerlo.  · Nimo menos alcohol.  · Consuma menos cafeína o deje de tomarla.  · Coma y duerma en horarios regulares.  · Duerma entre 7 y 9 horas o juanita le indique morley médico.  · Mantenga las luces tenues si le molesta las luces brillantes o rosa erica de curtis empeoran.  SOLICITE AYUDA DE INMEDIATO SI:   · El dolor de curtis empeora.  · Tiene fiebre.  · Presenta rigidez en el mick.  · Tiene dificultad para rachell.  · Rosa músculos están débiles o pierde el control muscular.  · Pierde equilibrio o tiene problemas para caminar.  · Siente que se desvanece (debilidad) o se desmaya.  · Tiene síntomas intensos que son diferentes a los primeros síntomas.  · Tiene problemas con los medicamentos que le recetó morley médico.  · El medicamento no le hace efecto.  · Siente que el dolor de curtis es diferente a otros erica de curtis.  · Tiene malestar estomacal (náuseas) o (vómitos).     Esta información no tiene juanita fin reemplazar el consejo del médico. Asegúrese de hacerle al médico cualquier pregunta que tenga.     Document Released: 03/11/2013 Document Revised: 05/03/2016  Elsevier Interactive Patient Education ©2016  Elsevier Inc.

## 2017-01-31 NOTE — ED NOTES
Pt dc home ambulatory w family verbalized understanding of dc instruction follow up and meds vitals stable no distress noted

## 2017-01-31 NOTE — ED PROVIDER NOTES
"ED Provider Note    Scribed for Mariusz Finley M.D. by Ladonna Luna. 1/30/2017, 8:18 PM.    Primary care provider: No primary care provider on file.  Means of arrival: Walk-In  History obtained from: Patient  History limited by: None    CHIEF COMPLAINT  Chief Complaint   Patient presents with   • Head Ache   • Dizziness     HPI  Tee Meadows is a 51 y.o. male who presents to the Emergency Department with persistent headache, dizziness, and hearing changes onset two weeks ago.  One month ago the patient fell backwards while at work and was \"hanging upside down for fifteen to thirty seconds\" from a trailer.  He did not hit his head or lose consciousness.  He was not immediately seen at the ED after the fall.  For the first two weeks following this incident the patient felt well.  However, approximately fourteen days ago he developed mild dizziness while trying to stand up.  Though dizzy, the patient did not fall.  Since then his symptoms have remained intermittent in nature but have steadily worsened.  The patient does not report attempts to treat his head pain prior to arrival.  Currently, the patient is not suffering from dizziness.  Per patient, he can initiate dizziness by turning his head from side to side.   He compares the sensation to feeling imbalanced and denies feeling like the room is spinning.  He also denies neck pain, nausea, numbness, and tingling.  The patient can not recall recent instances of confusion or disorientation.  The patient is otherwise healthy.  He does not suffer from chronic medical conditions or take daily medications.  The patient denies additional symptoms or further pertinent medical history.       REVIEW OF SYSTEMS  Review of Systems   HENT: Positive for tinnitus.         +Reported \"buzzing in ears\"   Gastrointestinal: Negative for nausea and vomiting.   Musculoskeletal: Positive for falls. Negative for neck pain.   Neurological: Positive for dizziness and " "headaches. Negative for tingling, sensory change, speech change and loss of consciousness.        -Numbness     Psychiatric/Behavioral: Negative for memory loss.   All other systems reviewed and are negative.    PAST MEDICAL HISTORY   The patient denies pertinent medical history.     SURGICAL HISTORY  patient denies any surgical history    SOCIAL HISTORY  The patient was accompanied to the ED by a co-worker.   Patient is employed.  Patient speaks Sami and is not fluent in English.   The patient denies further pertinent social history.     FAMILY HISTORY  No family history noted.     CURRENT MEDICATIONS  No daily medications noted.     ALLERGIES  No Known Allergies    PHYSICAL EXAM  VITAL SIGNS: /83 mmHg  Pulse 74  Temp(Src) 37.6 °C (99.6 °F) (Temporal)  Resp 16  Ht 1.626 m (5' 4.02\")  SpO2 96%    Pulse ox interpretation: I interpret this pulse ox as normal.  Constitutional: Alert with mild acute distress.  HENT: Normocephalic, Atraumatic, Bilateral external ears normal. Nose normal.   Eyes: Pupils are equal and reactive. Conjunctiva normal, non-icteric.   Heart: Regular rate and rythm, no murmurs.    Lungs: Clear to auscultation bilaterally.  Skin: Warm, Dry, No erythema, No rash.   Neurologic: Alert, Grossly non-focal. Normal finger to nose, Normal cranial nerves II-XII, No pronator drift. Equal 5/5 strength in upper and lower extremities bilaterally. Normal heel to shin test.  The patient ambulates with steady gait. No nystagmus on Westerlo-Hallpike maneuver; neither ear had reported worse dizziness.   Psychiatric: Affect normal, Judgment normal, Mood normal, Appears appropriate and not intoxicated.     LABS  Results for orders placed or performed during the hospital encounter of 01/30/17   CBC WITH DIFFERENTIAL   Result Value Ref Range    WBC 6.8 4.8 - 10.8 K/uL    RBC 5.00 4.70 - 6.10 M/uL    Hemoglobin 16.1 14.0 - 18.0 g/dL    Hematocrit 45.7 42.0 - 52.0 %    MCV 91.4 81.4 - 97.8 fL    MCH 32.2 27.0 - " 33.0 pg    MCHC 35.2 33.7 - 35.3 g/dL    RDW 40.9 35.9 - 50.0 fL    Platelet Count 291 164 - 446 K/uL    MPV 9.4 9.0 - 12.9 fL    Neutrophils-Polys 48.70 44.00 - 72.00 %    Lymphocytes 41.80 (H) 22.00 - 41.00 %    Monocytes 8.40 0.00 - 13.40 %    Eosinophils 0.10 0.00 - 6.90 %    Basophils 0.90 0.00 - 1.80 %    Immature Granulocytes 0.10 0.00 - 0.90 %    Nucleated RBC 0.00 /100 WBC    Neutrophils (Absolute) 3.29 1.82 - 7.42 K/uL    Lymphs (Absolute) 2.83 1.00 - 4.80 K/uL    Monos (Absolute) 0.57 0.00 - 0.85 K/uL    Eos (Absolute) 0.01 0.00 - 0.51 K/uL    Baso (Absolute) 0.06 0.00 - 0.12 K/uL    Immature Granulocytes (abs) 0.01 0.00 - 0.11 K/uL    NRBC (Absolute) 0.00 K/uL   COMP METABOLIC PANEL   Result Value Ref Range    Sodium 141 135 - 145 mmol/L    Potassium 3.8 3.6 - 5.5 mmol/L    Chloride 105 96 - 112 mmol/L    Co2 25 20 - 33 mmol/L    Anion Gap 11.0 0.0 - 11.9    Glucose 95 65 - 99 mg/dL    Bun 15 8 - 22 mg/dL    Creatinine 0.70 0.50 - 1.40 mg/dL    Calcium 10.0 8.5 - 10.5 mg/dL    AST(SGOT) 21 12 - 45 U/L    ALT(SGPT) 24 2 - 50 U/L    Alkaline Phosphatase 79 30 - 99 U/L    Total Bilirubin 0.9 0.1 - 1.5 mg/dL    Albumin 4.7 3.2 - 4.9 g/dL    Total Protein 8.1 6.0 - 8.2 g/dL    Globulin 3.4 1.9 - 3.5 g/dL    A-G Ratio 1.4 g/dL   TROPONIN   Result Value Ref Range    Troponin I <0.01 0.00 - 0.04 ng/mL   PROTHROMBIN TIME   Result Value Ref Range    PT 14.1 12.0 - 14.6 sec    INR 1.06 0.87 - 1.13   APTT   Result Value Ref Range    APTT 28.7 24.7 - 36.0 sec   ESTIMATED GFR   Result Value Ref Range    GFR If African American >60 >60 mL/min/1.73 m 2    GFR If Non African American >60 >60 mL/min/1.73 m 2   EKG (NOW)   Result Value Ref Range    Report       [facility] Emergency Dept.    Test Date:  2017  Pt Name:    ANTON KESSLERROBERT PLATA       Department: ER  MRN:        [id number]                      Room:       [facility]  Gender:     M                            Technician: [id number]  :         1965                   Requested By:SUHAS MCGREGOR  Order #:    [id number]                    Reading MD:    Measurements  Intervals                                Axis  Rate:       70                           P:          17  AK:         164                          QRS:        31  QRSD:       144                          T:          22  QT:         424  QTc:        458    Interpretive Statements  SINUS RHYTHM  RIGHT BUNDLE BRANCH BLOCK  No previous ECG available for comparison       All labs reviewed by me.    RADIOLOGY  CT-CTA NECK WITH & W/O-POST PROCESSING   Final Result      1.  CT angiogram of the neck within normal limits.      2.  Multiple thick walled cystic structures in the right upper lobe consistent with either bronchiectasis or cavity. Atypical infection is possible.      CT-CTA HEAD WITH & W/O-POST PROCESS   Final Result      CT angiogram of the [place] within normal limits.        The radiologist's interpretation of all radiological studies have been reviewed by me.    EK Lead EKG interpreted by me shows a normal sinus rhythm at a rate of 70. Axis normal. No ST elevations. Single T wave inversion in Lead 3. Right bundle branch block. No old EKG for comparison. Final Impression. Right bundle branch block otherwise normal EKG.      COURSE & MEDICAL DECISION MAKING  Nursing notes, VS, PMSFHx reviewed in chart.    8:18 PM - Patient seen and examined at bedside with the help of a Lao interpretor. Patient presents with normal neurologic examination.  He has reported intermittent, persistent dizziness onset two weeks ago, exacerbated after looking from side to side or up and down.  Patient denies room spinning sensation and compares his dizziness to imbalance.  Patient has not recently fallen or lost consciousness.  The patient presents without nystagmus.  Patient will be treated with an NS Infusion, 1,000 ml. Ordered CT-CTA Head, CT-CTA Neck, Prothrombin, APTT, CBC, CMP,  Troponin, and an EKG to evaluate his symptoms.     9:17 PM- At this time I obtained and reviewed the patient's EKG.  My findings can be seen above. The patient will be updated during my next recheck.     10:40 PM- At this time the patient's ED work up is complete.  I extensively reviewed the lab results, images, and radiologist's interpretations, see above.  The patient will be updated shortly.     10:46 PM - Re-examined. [place]ic interpretor used once more. The patient is resting in bed comfortably. I discussed his above findings and plans for discharge with prescription for Antivert. The patient will follow up with his primary care physician for the cyst on his right lung.  He was given a referral to the [facility], where he will schedule an appointment in two days.  The patient was instructed to return to the ED if his symptoms worsen, including numbness, stroke-like symptoms, or worsening dizziness. The patient will receive further information to take home.  All questions and concerns were addressed.  Patient understands and agrees.    Medical Decision Making: At this point time the exact etiology of the patient's dizziness is unclear. This may be just peripheral vertigo or Ménière's disease. I do not think this is related to the patient's fall he did not have any symptoms immediately after the fall and there is a 2 week interval between onset of symptoms. Patient does not show any signs of intracranial hemorrhage. CTA of the neck was done because the patient stated he was getting dizzy when he looked up thinking he may have vertebral insufficiency causing his dizziness. At this point in time his EKG just shows a right bundle branch block do not think is cardiac in nature. Patient will be started on Antivert and I will have him follow-up with his doctor. I have discussed with him the incidental findings of his lung cyst and have discussed with him that he needs to follow-up with  his primary care for further evaluation.     The patient will return for new or worsening symptoms and is stable at the time of discharge.  Patient's blood pressure was elevated, I believe it is likely secondary to medical condition. However I have advised home monitoring and if it continues to be 120/80 or higher, advised to followup with primary care physician for blood pressure management.     DISPOSITION:  Patient will be discharged home in stable condition.    FOLLOW UP:  [facility]  [address]  [place] [address]  [phone]    Schedule an appointment as soon as possible for a visit in 2 days      [facility]  [address]  [place] [address]  [phone]          [address] [address]  [phone]    If you need a doctor    OUTPATIENT MEDICATIONS:  Discharge Medication List as of 1/30/2017 10:51 PM      START taking these medications    Details   meclizine (ANTIVERT) 25 MG Tab Take 1 Tab by mouth 3 times a day as needed., Disp-30 Tab, R-0, Print Rx Paper           FINAL IMPRESSION  1. Dizziness    2. Nonintractable episodic headache, unspecified headache type    3. Lung cyst        Ladonna FERMIN (Deeibe), am scribing for, and in the presence of, Mariusz Finley M.D.    Electronically signed by: Ladonna Luna (Michelle), 1/30/2017    Mariusz FERMIN M.D. personally performed the services described in this documentation, as scribed by Ladonna Luna in my presence, and it is both accurate and complete.    The note accurately reflects work and decisions made by me.  Mariusz Finley  1/31/2017  2:11 AM

## 2024-08-29 ENCOUNTER — OCCUPATIONAL MEDICINE (OUTPATIENT)
Dept: URGENT CARE | Facility: CLINIC | Age: 59
End: 2024-08-29
Payer: COMMERCIAL

## 2024-08-29 VITALS
SYSTOLIC BLOOD PRESSURE: 126 MMHG | RESPIRATION RATE: 14 BRPM | HEART RATE: 76 BPM | HEIGHT: 65 IN | DIASTOLIC BLOOD PRESSURE: 78 MMHG | TEMPERATURE: 97.2 F | OXYGEN SATURATION: 97 % | BODY MASS INDEX: 29.66 KG/M2 | WEIGHT: 178 LBS

## 2024-08-29 DIAGNOSIS — S61.217A LACERATION OF LEFT LITTLE FINGER WITHOUT FOREIGN BODY WITHOUT DAMAGE TO NAIL, INITIAL ENCOUNTER: ICD-10-CM

## 2024-08-29 RX ORDER — METFORMIN HCL 500 MG
TABLET, EXTENDED RELEASE 24 HR ORAL
COMMUNITY

## 2024-08-29 RX ORDER — LISINOPRIL 10 MG/1
TABLET ORAL
COMMUNITY

## 2024-08-29 RX ORDER — ATORVASTATIN CALCIUM 10 MG/1
TABLET, FILM COATED ORAL
COMMUNITY

## 2024-08-29 NOTE — LETTER
PHYSICIAN’S AND CHIROPRACTIC PHYSICIAN'S   PROGRESS REPORT CERTIFICATION OF DISABILITY Claim Number:     Social Security Number:    Patient’s Name: Tee Meadows Date of Injury: 8/29/2024   Employer:  [employer] Name of MCO (if applicable):      Patient’s Job Description/Occupation:        Previous Injuries/Diseases/Surgeries Contributing to the Condition:  No      Diagnosis: (S61.217A) Laceration of left little finger without foreign body without damage to nail, initial encounter      Related to the Industrial Injury? Yes     Explain:        Objective Medical Findings: Approximate 4 cm superficial curvilinear laceration to the volar distal left little finger.  No foreign body.  Full range of motion and distal neurovascular intact         None - Discharged                         Stable  Yes                 Ratable  No        Generally Improved                         Condition Worsened                  Condition Same  May Have Suffered a Permanent Disability No     Treatment Plan:    Sutures placed, removed in 7 to 10 days  Wound care discussed, watch out for infection  Tdap         No Change in Therapy                  PT/OT Prescribed                      Medication May be Used While Working        Case Management                          PT/OT Discontinued    Consultation    Further Diagnostic Studies:    Prescription(s)                 Released to FULL DUTY /No Restrictions on (Date):  From:      Certified TOTALLY TEMPORARILY DISABLED (Indicate Dates) From:   To:    X  Released to RESTRICTED/Modified Duty on (Date): From: 8/29/2024 To: 9/1/2024  Restrictions Are:         No Sitting    No Standing    No Pulling Other: Limited use and lifting of left hand.  Keep wound clean, dry and covered at work       No Bending at Waist     No Stooping X    No Lifting        No Carrying     No Walking Lifting Restricted to (lbs.):  < or = to 10 pounds       No Pushing        No  Climbing     No Reaching Above Shoulders       Date of Next Visit:  9/1/2024 Date of this Exam: 8/29/2024 Physician/Chiropractic Physician Name: Pierre Brannon P.A.-C. Physician/Chiropractic Physician Signature:  Cruzito Jensen DO MPH                                                                                                                                                                                                            [id number] (Rev. 2/24)

## 2024-08-29 NOTE — LETTER
"    EMPLOYEE’S CLAIM FOR COMPENSATION/ REPORT OF INITIAL TREATMENT  FORM C-4  PLEASE TYPE OR PRINT    EMPLOYEE’S CLAIM - PROVIDE ALL INFORMATION REQUESTED   First Name                    JANA Oliveira                  Last Name  Enrique Meadows Birthdate                    1965                Sex  Male Claim Number (Insurer’s Use Only)     Home Address  [address] Age  59 y.o. 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 (if applicable)  Pruning a bush    How did this injury or occupational disease occur? (Be specific and answer in detail. Use additional sheet if necessary)  I was grabing a branch and my Left hand rubbed against the Hedgar blade   If you believe that you have an occupational disease, when did you first have knowledge of the disability and its relationship to your employment?  N/a Witnesses to the Accident (if applicable)  N/a      Nature of Injury or Occupational Disease  Laceration  Part(s) of Body Injured or Affected  Hand (L) Finger (L) N/A    I CERTIFY THAT THE ABOVE IS TRUE AND " CORRECT TO T HE BEST OF MY KNOWLEDGE AND THAT I HAVE PROVIDED THIS INFORMATION IN ORDER TO OBTAIN THE BENEFITS OF [place], OR [place]  I HEREBY AUTHORIZE ANY PHYSICIAN, CHIROPRACTOR, SURGEON, PRACTITIONER OR ANY OTHER PERSON, ANY HOSPITAL, INCLUDING [facility] OR [facility], ANY  MEDICAL SERVICE ORGANIZATION, ANY INSURANCE COMPANY, OR OTHER INSTITUTION OR ORGANIZATION TO RELEASE TO EACH OTHER, ANY MEDICAL OR OTHER INFORMATION, INCLUDING BENEFITS PAID OR PAYABLE, PERTINENT TO THIS INJURY OR DISEASE, EXCEPT INFORMATION RELATIVE TO DIAGNOSIS, TREATMENT AND/OR COUNSELING FOR AIDS, PSYCHOLOGICAL CONDITIONS, ALCOHOL OR CONTROLLED SUBSTANCES, FOR WHICH I MUST GIVE SPECIFIC AUTHORIZATION.  A PHOTOSTAT OF THIS AUTHORIZATION SHALL BE VALID AS THE ORIGINAL.      Date 8/29/2024    Place [facility] Employee’s Original or  *Electronic Signature   THIS REPORT MUST BE COMPLETED AND MAILED WITHIN 3 WORKING DAYS OF TREATMENT   Place  [facility]    Name of Facility  [facility]   Date 8/29/2024 Diagnosis and Description of Injury or Occupational Disease  (S61.217A) Laceration of left little finger without foreign body without damage to nail, initial encounter  The encounter diagnosis was Laceration of left little finger without foreign body without damage to nail, initial encounter. Is there evidence that the injured employee was under the influence of alcohol and/or another controlled substance at the time of accident?  [x]No  [] Yes (if yes, please explain)   Hour 9:07 AM  No   Treatment: Sutures placed  Wound care discussed, watch out for infection   Tdap    Have you advised the patient to remain off work five days or more?   [] Yes Indicate dates: From   To    [] No      If no, is the injured employee capable of: [] full duty [] modified duty                     If modified duty, specify any limitations /  restrictions:  No lifting more than 10 pounds  Limited use left hand.  Keep wound clean dry and covered at work                                                                                                                                                                                                                                                                                                                                                                                                               X-Ray Findings:  N/A    From information given by the employee, together with medical evidence, can you directly connect this injury or occupational disease as job incurred?  [x]Yes   [] No Yes    Is additional medical care by a physician indicated? [x]Yes [] No  Yes    Do you know of any previous injury or disease contributing to this condition or occupational disease? []Yes [] No (Explain if yes)                          No   Date  8/29/2024 Print Health Care Provider’s Name  Julia Brannon P.A.-C. I certify that the employer’s copy of  this form was delivered to the employer on:   Address  [address] [place] [address] INSURER'S USE ONLY                       [facility]  [address] Provider’s Tax ID Number  [id number]   Telephone  Dept: [phone]    Health Care Provider’s Original or Electronic Signature  e-JULIA Brown P.A.-C. Degree (MD,DO, DC,PAStefanieC,APRN)  PABROOKLYNN  Choose (if applicable)      ORIGINAL - TREATING HEALTHCARE PROVIDER PAGE 2 - INSURER/TPA PAGE 3 - EMPLOYER PAGE 4 - EMPLOYEE             Form C-4 (rev.08/23)

## 2024-08-29 NOTE — PROGRESS NOTES
"Subjective     Tee Meadows is a very pleasant 59 y.o. male who presents with Hand Injury (X today/ NEW WC / L hand/ pinky finger laceration/ actively bleeding )            HPI  Approximate 4 cm superficial curvilinear laceration to the distal volar left fifth digit.  He was pruning bushes with a gas la.  He actually got his finger caught in the blade.  No foreign body.  No numbness tingling or weakness..  No previous injury.  Tetanus update      ROS           Objective     /78   Pulse 76   Temp 36.2 °C (97.2 °F)   Resp 14   Ht 1.651 m (5' 5\")   Wt 80.7 kg (178 lb)   SpO2 97%   BMI 29.62 kg/m²      Physical Exam    Approximate 4 cm superficial curvilinear laceration to the volar distal left little finger.  No foreign body.  Full range of motion and distal neurovascular intact          Procedure: Laceration Repair  -Risks including bleeding, nerve damage, infection, and poor cosmetic outcome discussed at length. Benefits and alternatives discussed. Verbal consent was obtained.  -Sterile technique throughout  -Wound copiously irrigated with NS and betadine prep used prior to initiation  -Local anesthesia with 1% lidocaine  -Closed with #8  5-0 Nylon interrupted sutures with good wound approximation  -Polysporin and dressing placed  -Patient tolerated well with no complications or blood loss  -Wound care discussed  -Signs and symptoms of new/worsening infection discussed at length             Assessment & Plan        Assessment & Plan  Laceration of left little finger without foreign body without damage to nail, initial encounter  Keep area clean, dry and covered at work  Wound check in 3 days, suture removal in 7 to 10 days  Watch out for infection    Orders:    Tdap =>8yo IM          Return to clinic or go to ED if symptoms worsen or persist. Red flag symptoms and indications for ED discussed at length. Patient voices understanding. All side effects and potential interactions of medication " discussed including allergic response, GI upset, sedation, etc.    Please note that this dictation was created using voice recognition software. I have made every reasonable attempt to correct obvious errors, but I expect that there are errors of grammar and possibly content that I did not discover before finalizing the note.

## 2024-09-01 ENCOUNTER — OCCUPATIONAL MEDICINE (OUTPATIENT)
Dept: URGENT CARE | Facility: CLINIC | Age: 59
End: 2024-09-01
Payer: COMMERCIAL

## 2024-09-01 VITALS
BODY MASS INDEX: 22.36 KG/M2 | HEIGHT: 64 IN | SYSTOLIC BLOOD PRESSURE: 94 MMHG | OXYGEN SATURATION: 98 % | RESPIRATION RATE: 17 BRPM | HEART RATE: 79 BPM | WEIGHT: 131 LBS | DIASTOLIC BLOOD PRESSURE: 66 MMHG | TEMPERATURE: 97.4 F

## 2024-09-01 DIAGNOSIS — S61.217A LACERATION OF LEFT LITTLE FINGER WITHOUT FOREIGN BODY WITHOUT DAMAGE TO NAIL, INITIAL ENCOUNTER: ICD-10-CM

## 2024-09-01 PROCEDURE — 3078F DIAST BP <80 MM HG: CPT | Performed by: FAMILY MEDICINE

## 2024-09-01 PROCEDURE — 3074F SYST BP LT 130 MM HG: CPT | Performed by: FAMILY MEDICINE

## 2024-09-01 PROCEDURE — 99024 POSTOP FOLLOW-UP VISIT: CPT | Performed by: FAMILY MEDICINE

## 2024-09-01 RX ORDER — IBUPROFEN 400 MG/1
400 TABLET, FILM COATED ORAL EVERY 6 HOURS PRN
COMMUNITY

## 2024-09-01 ASSESSMENT — ENCOUNTER SYMPTOMS: FEVER: 0

## 2024-09-01 NOTE — PROGRESS NOTES
"Subjective:     Tee Meadows is a 59 y.o. male who presents for Follow-Up ([facility] DOI 8/29/2024 [employer], LEFT HAND/PINKY FINGER INJURY. Patient states that he feels better. )    HPI  Pt presents for wound check  DOI: 8/29/2024  BANG: Laceration when pruning bushes with a tremor  Had sutures placed same day as injury and was given Tdap injection  Patient feels he has been doing very well  No drainage or bleeding from the wound  Retains good range of motion  Pain is improved quite a bit    Review of Systems   Constitutional:  Negative for fever.       PMH:  has no past medical history on file.  MEDS:   Current Outpatient Medications:     ibuprofen (MOTRIN) 400 MG Tab, Take 400 mg by mouth every 6 hours as needed., Disp: , Rfl:     atorvastatin (LIPITOR) 10 MG Tab, TAKE 1 TABLET BY MOUTH ONCE DAILY AT BEDTIME FOR CHOLESTEROL LOWERING (dose decrease) Orally Once a day for 90 days, Disp: , Rfl:     lisinopril (PRINIVIL) 10 MG Tab, TAKE 1 TABLET BY MOUTH ONCE DAILY TO LOWER BLOOD PRESSURE oral once for 90 days, Disp: , Rfl:     metFORMIN ER (GLUCOPHAGE XR) 500 MG TABLET SR 24 HR, 1 tablet with evening meal Orally Once a day for prediabetes for 90 days, Disp: , Rfl:     meclizine (ANTIVERT) 25 MG Tab, Take 1 Tab by mouth 3 times a day as needed. (Patient not taking: Reported on 9/1/2024), Disp: 30 Tab, Rfl: 0  ALLERGIES: No Known Allergies  SURGHX: History reviewed. No pertinent surgical history.  SOCHX:       Objective:   BP 94/66   Pulse 79   Temp 36.3 °C (97.4 °F) (Temporal)   Resp 17   Ht 1.626 m (5' 4\")   Wt 59.4 kg (131 lb)   SpO2 98%   BMI 22.49 kg/m²     Physical Exam  Constitutional:       General: He is not in acute distress.     Appearance: He is well-developed. He is not diaphoretic.   Pulmonary:      Effort: Pulmonary effort is normal.   Neurological:      Mental Status: He is alert.     Healing laceration of the left fifth finger.  Sutures in place with no surrounding " erythema, no surrounding areas of tenderness, and no active drainage or wound dehiscence.  Wound appears to have a flap type appearance and there is some ecchymosis and darkening of the edges of the flap with no cyanosis or darkening of the distal finger.  Cap refill less than 2 seconds.    Assessment/Plan:   Assessment    1. Laceration of left little finger without foreign body without damage to nail, initial encounter    Other orders  - ibuprofen (MOTRIN) 400 MG Tab; Take 400 mg by mouth every 6 hours as needed.    Patient with left fifth finger laceration.  Here for wound check.  Overall appears to be healing okay.  The wound type was more of a flap and there are some edges of the flap which are appearing to have quite a bit of ecchymosis.  Part of this skin will likely die, but patient has good vascular flow around other areas and no evidence of vascular compromise distally.  Patient neurovascularly intact, dressed wound with sterile dressings, and reviewed wound care with him.  Plan to follow-up in about 6 days for planned suture removal.

## 2024-09-01 NOTE — LETTER
PHYSICIAN’S AND CHIROPRACTIC PHYSICIAN'S   PROGRESS REPORT CERTIFICATION OF DISABILITY Claim Number:     Social Security Number:    Patient’s Name: Tee Meadows Date of Injury: 8/29/2024   Employer:  [employer]  Name of [employer]CO (if applicable):      Patient’s Job Description/Occupation:       Previous Injuries/Diseases/Surgeries Contributing to the Condition:  None      Diagnosis: (S61.217A) Laceration of left little finger without foreign body without damage to nail, initial encounter      Related to the Industrial Injury? Yes     Explain: Laceration at work       Objective Medical Findings: Healing laceration of the left fifth finger.  Sutures in place with no surrounding erythema, no surrounding areas of tenderness, and no active drainage or wound dehiscence.  Wound appears to have a flap type appearance and there is some ecchymosis and darkening of the edges of the flap with no cyanosis or darkening of the distal finger.  Cap refill less than 2 seconds.         None - Discharged                         Stable  No                 Ratable  No        Generally Improved                         Condition Worsened               X   Condition Same  May Have Suffered a Permanent Disability No     Treatment Plan:    Suture removal in about 5 days         No Change in Therapy                  PT/OT Prescribed                      Medication May be Used While Working        Case Management                          PT/OT Discontinued    Consultation    Further Diagnostic Studies:    Prescription(s)                 Released to FULL DUTY /No Restrictions on (Date):  From:      Certified TOTALLY TEMPORARILY DISABLED (Indicate Dates) From:   To:    X  Released to RESTRICTED/Modified Duty on (Date): From: 9/1/2024 To: 9/7/2024  Restrictions Are:  Temporary      No Sitting    No Standing    No Pulling Other: Keep left fifth finger covered while at work, avoid repetitive gripping or pressure on the  area of laceration, weight limit with left upper extremity 10 pounds or less       No Bending at Waist     No Stooping     No Lifting        No Carrying     No Walking Lifting Restricted to (lbs.):          No Pushing        No Climbing     No Reaching Above Shoulders       Date of Next Visit:    9/7/2024  @ 12:00 PM Date of this Exam: 9/1/2024 Physician/Chiropractic Physician Name: Albaro Thompson M.D. Physician/Chiropractic Physician Signature:  Cruzito Jensen DO MPH                                                                                                                                                                                                            [id number] (Rev. 2/24)

## 2024-09-07 ENCOUNTER — OCCUPATIONAL MEDICINE (OUTPATIENT)
Dept: URGENT CARE | Facility: CLINIC | Age: 59
End: 2024-09-07
Payer: COMMERCIAL

## 2024-09-07 VITALS
RESPIRATION RATE: 16 BRPM | HEIGHT: 63 IN | TEMPERATURE: 97.7 F | OXYGEN SATURATION: 97 % | BODY MASS INDEX: 23.73 KG/M2 | HEART RATE: 72 BPM | SYSTOLIC BLOOD PRESSURE: 100 MMHG | WEIGHT: 133.9 LBS | DIASTOLIC BLOOD PRESSURE: 48 MMHG

## 2024-09-07 DIAGNOSIS — S61.217D: ICD-10-CM

## 2024-09-07 DIAGNOSIS — Z48.02 VISIT FOR SUTURE REMOVAL: ICD-10-CM

## 2024-09-07 PROCEDURE — 3078F DIAST BP <80 MM HG: CPT | Performed by: FAMILY MEDICINE

## 2024-09-07 PROCEDURE — 99212 OFFICE O/P EST SF 10 MIN: CPT | Performed by: FAMILY MEDICINE

## 2024-09-07 PROCEDURE — 3074F SYST BP LT 130 MM HG: CPT | Performed by: FAMILY MEDICINE

## 2024-09-07 PROCEDURE — 15853 REMOVAL SUTR/STAPL XREQ ANES: CPT | Performed by: FAMILY MEDICINE

## 2024-09-07 ASSESSMENT — ENCOUNTER SYMPTOMS: BRUISES/BLEEDS EASILY: 0

## 2024-09-07 NOTE — PROGRESS NOTES
"Subjective     Tee Meadows is a 59 y.o. 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  No bleeding.  No discharge.  No function loss.        Review of Systems   Musculoskeletal:  Negative for joint pain.   Skin:  Negative for itching and rash.   Endo/Heme/Allergies:  Does not bruise/bleed easily.              Objective     /48 (BP Location: Right arm, Patient Position: Sitting, BP Cuff Size: Adult)   Pulse 72   Temp 36.5 °C (97.7 °F) (Temporal)   Resp 16   Ht 1.605 m (5' 3.19\")   Wt 60.7 kg (133 lb 14.4 oz)   SpO2 97%   BMI 23.58 kg/m²      Physical Exam    Well-healing laceration without evidence of infection.     8 interrupted sutures removed without difficulty.             Assessment & Plan   Urgent care visits 8/29/2024 and 9/1/2024 reviewed     Assessment & Plan  Laceration of left little finger, subsequent encounter       Doing well.  Sutures removed.  Discharge MMI.                "